# Patient Record
Sex: FEMALE | Race: OTHER | NOT HISPANIC OR LATINO | URBAN - METROPOLITAN AREA
[De-identification: names, ages, dates, MRNs, and addresses within clinical notes are randomized per-mention and may not be internally consistent; named-entity substitution may affect disease eponyms.]

---

## 2024-03-07 ENCOUNTER — EMERGENCY (EMERGENCY)
Facility: HOSPITAL | Age: 84
LOS: 1 days | Discharge: HOME | End: 2024-03-07
Attending: EMERGENCY MEDICINE
Payer: MEDICARE

## 2024-03-07 VITALS
SYSTOLIC BLOOD PRESSURE: 114 MMHG | RESPIRATION RATE: 18 BRPM | WEIGHT: 100.09 LBS | DIASTOLIC BLOOD PRESSURE: 65 MMHG | HEART RATE: 73 BPM | OXYGEN SATURATION: 98 % | TEMPERATURE: 98 F

## 2024-03-07 VITALS
SYSTOLIC BLOOD PRESSURE: 107 MMHG | RESPIRATION RATE: 18 BRPM | OXYGEN SATURATION: 97 % | DIASTOLIC BLOOD PRESSURE: 63 MMHG | HEART RATE: 81 BPM

## 2024-03-07 LAB
ALBUMIN SERPL ELPH-MCNC: 2.6 G/DL — LOW (ref 3.5–5)
ALP SERPL-CCNC: 130 U/L — HIGH (ref 40–120)
ALT FLD-CCNC: 31 U/L DA — SIGNIFICANT CHANGE UP (ref 10–60)
ANION GAP SERPL CALC-SCNC: 4 MMOL/L — LOW (ref 5–17)
AST SERPL-CCNC: 37 U/L — SIGNIFICANT CHANGE UP (ref 10–40)
BASOPHILS # BLD AUTO: 0.03 K/UL — SIGNIFICANT CHANGE UP (ref 0–0.2)
BASOPHILS NFR BLD AUTO: 0.4 % — SIGNIFICANT CHANGE UP (ref 0–2)
BILIRUB SERPL-MCNC: 0.2 MG/DL — SIGNIFICANT CHANGE UP (ref 0.2–1.2)
BUN SERPL-MCNC: 36 MG/DL — HIGH (ref 7–18)
CALCIUM SERPL-MCNC: 8.9 MG/DL — SIGNIFICANT CHANGE UP (ref 8.4–10.5)
CHLORIDE SERPL-SCNC: 102 MMOL/L — SIGNIFICANT CHANGE UP (ref 96–108)
CK SERPL-CCNC: 100 U/L — SIGNIFICANT CHANGE UP (ref 21–215)
CO2 SERPL-SCNC: 26 MMOL/L — SIGNIFICANT CHANGE UP (ref 22–31)
CREAT SERPL-MCNC: 1.15 MG/DL — SIGNIFICANT CHANGE UP (ref 0.5–1.3)
EGFR: 47 ML/MIN/1.73M2 — LOW
EOSINOPHIL # BLD AUTO: 0.13 K/UL — SIGNIFICANT CHANGE UP (ref 0–0.5)
EOSINOPHIL NFR BLD AUTO: 1.7 % — SIGNIFICANT CHANGE UP (ref 0–6)
GLUCOSE SERPL-MCNC: 121 MG/DL — HIGH (ref 70–99)
HCT VFR BLD CALC: 25.3 % — LOW (ref 34.5–45)
HGB BLD-MCNC: 8.3 G/DL — LOW (ref 11.5–15.5)
IMM GRANULOCYTES NFR BLD AUTO: 0.5 % — SIGNIFICANT CHANGE UP (ref 0–0.9)
LYMPHOCYTES # BLD AUTO: 0.93 K/UL — LOW (ref 1–3.3)
LYMPHOCYTES # BLD AUTO: 12.3 % — LOW (ref 13–44)
MAGNESIUM SERPL-MCNC: 2.2 MG/DL — SIGNIFICANT CHANGE UP (ref 1.6–2.6)
MCHC RBC-ENTMCNC: 32.7 PG — SIGNIFICANT CHANGE UP (ref 27–34)
MCHC RBC-ENTMCNC: 32.8 GM/DL — SIGNIFICANT CHANGE UP (ref 32–36)
MCV RBC AUTO: 99.6 FL — SIGNIFICANT CHANGE UP (ref 80–100)
MONOCYTES # BLD AUTO: 0.62 K/UL — SIGNIFICANT CHANGE UP (ref 0–0.9)
MONOCYTES NFR BLD AUTO: 8.2 % — SIGNIFICANT CHANGE UP (ref 2–14)
NEUTROPHILS # BLD AUTO: 5.84 K/UL — SIGNIFICANT CHANGE UP (ref 1.8–7.4)
NEUTROPHILS NFR BLD AUTO: 76.9 % — SIGNIFICANT CHANGE UP (ref 43–77)
NRBC # BLD: 0 /100 WBCS — SIGNIFICANT CHANGE UP (ref 0–0)
NT-PROBNP SERPL-SCNC: 486 PG/ML — HIGH (ref 0–450)
PLATELET # BLD AUTO: 355 K/UL — SIGNIFICANT CHANGE UP (ref 150–400)
POTASSIUM SERPL-MCNC: 5 MMOL/L — SIGNIFICANT CHANGE UP (ref 3.5–5.3)
POTASSIUM SERPL-SCNC: 5 MMOL/L — SIGNIFICANT CHANGE UP (ref 3.5–5.3)
PROT SERPL-MCNC: 6.6 G/DL — SIGNIFICANT CHANGE UP (ref 6–8.3)
RBC # BLD: 2.54 M/UL — LOW (ref 3.8–5.2)
RBC # FLD: 15.6 % — HIGH (ref 10.3–14.5)
SODIUM SERPL-SCNC: 132 MMOL/L — LOW (ref 135–145)
TROPONIN I, HIGH SENSITIVITY RESULT: 52.9 NG/L — SIGNIFICANT CHANGE UP
WBC # BLD: 7.59 K/UL — SIGNIFICANT CHANGE UP (ref 3.8–10.5)
WBC # FLD AUTO: 7.59 K/UL — SIGNIFICANT CHANGE UP (ref 3.8–10.5)

## 2024-03-07 PROCEDURE — 99285 EMERGENCY DEPT VISIT HI MDM: CPT | Mod: 25

## 2024-03-07 PROCEDURE — 71045 X-RAY EXAM CHEST 1 VIEW: CPT

## 2024-03-07 PROCEDURE — 72125 CT NECK SPINE W/O DYE: CPT | Mod: 26,MC

## 2024-03-07 PROCEDURE — 82550 ASSAY OF CK (CPK): CPT

## 2024-03-07 PROCEDURE — 36415 COLL VENOUS BLD VENIPUNCTURE: CPT

## 2024-03-07 PROCEDURE — 99285 EMERGENCY DEPT VISIT HI MDM: CPT

## 2024-03-07 PROCEDURE — 85025 COMPLETE CBC W/AUTO DIFF WBC: CPT

## 2024-03-07 PROCEDURE — 90715 TDAP VACCINE 7 YRS/> IM: CPT

## 2024-03-07 PROCEDURE — 83735 ASSAY OF MAGNESIUM: CPT

## 2024-03-07 PROCEDURE — 93010 ELECTROCARDIOGRAM REPORT: CPT

## 2024-03-07 PROCEDURE — 84484 ASSAY OF TROPONIN QUANT: CPT

## 2024-03-07 PROCEDURE — 72170 X-RAY EXAM OF PELVIS: CPT | Mod: 26

## 2024-03-07 PROCEDURE — 83880 ASSAY OF NATRIURETIC PEPTIDE: CPT

## 2024-03-07 PROCEDURE — 71045 X-RAY EXAM CHEST 1 VIEW: CPT | Mod: 26

## 2024-03-07 PROCEDURE — 90471 IMMUNIZATION ADMIN: CPT

## 2024-03-07 PROCEDURE — 70450 CT HEAD/BRAIN W/O DYE: CPT | Mod: MC

## 2024-03-07 PROCEDURE — 70486 CT MAXILLOFACIAL W/O DYE: CPT | Mod: 26,MC

## 2024-03-07 PROCEDURE — 80053 COMPREHEN METABOLIC PANEL: CPT

## 2024-03-07 PROCEDURE — 70486 CT MAXILLOFACIAL W/O DYE: CPT | Mod: MC

## 2024-03-07 PROCEDURE — 72170 X-RAY EXAM OF PELVIS: CPT

## 2024-03-07 PROCEDURE — 72125 CT NECK SPINE W/O DYE: CPT | Mod: MC

## 2024-03-07 PROCEDURE — 93005 ELECTROCARDIOGRAM TRACING: CPT

## 2024-03-07 PROCEDURE — 70450 CT HEAD/BRAIN W/O DYE: CPT | Mod: 26,MC

## 2024-03-07 RX ORDER — TETANUS TOXOID, REDUCED DIPHTHERIA TOXOID AND ACELLULAR PERTUSSIS VACCINE, ADSORBED 5; 2.5; 8; 8; 2.5 [IU]/.5ML; [IU]/.5ML; UG/.5ML; UG/.5ML; UG/.5ML
0.5 SUSPENSION INTRAMUSCULAR ONCE
Refills: 0 | Status: COMPLETED | OUTPATIENT
Start: 2024-03-07 | End: 2024-03-07

## 2024-03-07 RX ADMIN — TETANUS TOXOID, REDUCED DIPHTHERIA TOXOID AND ACELLULAR PERTUSSIS VACCINE, ADSORBED 0.5 MILLILITER(S): 5; 2.5; 8; 8; 2.5 SUSPENSION INTRAMUSCULAR at 18:36

## 2024-03-07 NOTE — ED ADULT TRIAGE NOTE - CHIEF COMPLAINT QUOTE
found in shower floor, unknown LOC, + head strike with abrasion in the top of the head, no blood thinners

## 2024-03-07 NOTE — ED PROVIDER NOTE - PHYSICAL EXAMINATION
Gen:  Awake, alert, NAD, elderly/frail, non-toxic appearing. No skull/facial tender, no step-offs, no raccoon/lee.  Eyes:  PERRL, EOMI, no icterus, normal lids/lashes, normal conjunctivae.  ENT:  External inspection normal, pink/moist membranes. Pharynx normal, no pharyngeal erythema/exudate, no drooling, no lip/tongue/palate/posterior pharynx edema, midline uvula, no oropharyngeal ulcerations/lesions. No septal hematoma, no sinus/tmj/dental/temporal/mastoid tender.]  CV:  S1S2, regular rate and rhythm, no murmur/gallops/rubs, no JVD, 2+ pulses b/l, no edema/cords/homans, warm/well-perfused.  Resp:  Normal respiratory rate/effort, no respiratory distress, normal voice, speaking full sentences, lungs clear to auscultation b/l, no wheezing/rales/rhonchi, no retractions, no stridor.  Abd:  Soft abdomen, no tender/distended/guarding/rebound, no pulsatile mass, no CVA tender.   Musculoskeletal:  N/V intact, FROM all 4 extremities, normal motor tone. Pelvis stable, no TLS spinal tender/deformity/step-offs, no perri tender/deformity.  Neck:  FROM neck, supple, trachea midline, no meningismus. No C-spine tender/deformity/step-offs.   Skin:  Color normal for race, warm and dry, no rash. R occiput hematoma w superficial abrasion; no bleeding or evidence of infection. R lateral orbit faint ecchymosis  Neuro:  Oriented to person, CN 2-12 intact (grossly), normal motor (grossly), normal sensory (grossly)  Psych:  Limited due to dementia

## 2024-03-07 NOTE — ED ADULT NURSE NOTE - NSFALLHARMRISKINTERV_ED_ALL_ED
Assistance OOB with selected safe patient handling equipment if applicable/Assistance with ambulation/Communicate risk of Fall with Harm to all staff, patient, and family/Monitor gait and stability/Provide visual cue: red socks, yellow wristband, yellow gown, etc/Reinforce activity limits and safety measures with patient and family/Bed in lowest position, wheels locked, appropriate side rails in place/Call bell, personal items and telephone in reach/Instruct patient to call for assistance before getting out of bed/chair/stretcher/Non-slip footwear applied when patient is off stretcher/[place] to call system/Physically safe environment - no spills, clutter or unnecessary equipment/Purposeful Proactive Rounding/Room/bathroom lighting operational, light cord in reach

## 2024-03-07 NOTE — ED PROVIDER NOTE - PATIENT PORTAL LINK FT
You can access the FollowMyHealth Patient Portal offered by [facility] by registering at the following website: [email or web address]. By joining [facility]’s FollowMyHealth portal, you will also be able to view your health information using other applications (apps) compatible with our system.

## 2024-03-07 NOTE — ED PROVIDER NOTE - OBJECTIVE STATEMENT
83 female with hx of GERD, anxiety, & Alzheimer's dementia.   Pt presenting to the ED with EMS reporting unwitnessed fall in shower.  No blood thinner use.  Patient reporting no complaints at this time.

## 2024-03-07 NOTE — ED PROVIDER NOTE - NSFOLLOWUPINSTRUCTIONS_ED_ALL_ED_FT
Please follow up with Dr Guzman in 2-3 days.  Return to the ER for worsening or concerning symptoms.  Apply ice to swollen areas  Keep wound clean and dry.  Apply antibiotic ointment to affected area twice a day.    - - - - - - - - - - - - -  Head Injury, Adult  Three rear views of the head showing how quick, sudden head movements injure the brain.  There are many types of head injuries. They can be as minor as a small bump. Some head injuries can be worse. Worse injuries include:  A strong hit to the head that shakes the brain back and forth, causing damage (concussion).  A bruise (contusion) of the brain. This means there is bleeding in the brain that can cause swelling.  A cracked skull (skull fracture).  Bleeding in the brain that gathers, gets thick (makes a clot), and forms a bump (hematoma).  Most problems from a head injury come in the first 24 hours. However, you may still have side effects up to 7–10 days after your injury. It is important to watch your condition for any changes. You may need to be watched in the emergency department or urgent care, or you may need to stay in the hospital.    What are the causes?  There are many possible causes of a head injury. A serious head injury may be caused by:  A car accident.  Bicycle or motorcycle accidents.  Sports injuries.  Falls.  Being hit by an object.  What are the signs or symptoms?  Symptoms of a head injury include a bruise, bump, or bleeding where the injury happened. Other physical symptoms may include:  Headache.  Feeling like you may vomit (nauseous) or vomiting.  Dizziness.  Blurred or double vision.  Being uncomfortable around bright lights or loud noises.  Feeling tired.  Trouble waking up.  Severe symptoms such as:  Feeling weak or numb on one side of the body.  Slurred speech.  Swallowing problems.  Fainting.  Shaking movements that you cannot control (seizures).  Mental or emotional symptoms may include:  Feeling grumpy or cranky.  Confusion and memory problems.  Having trouble paying attention or concentrating.  Changes in eating or sleeping habits.  Feeling worried or nervous (anxious).  Feeling sad (depressed).  How is this treated?  Treatment for this condition depends on how bad the injury is and the type of injury you have. The main goal is to prevent problems and to allow the brain time to heal.    Mild head injury    If you have a mild head injury, you may be sent home, and treatment may include:  Being watched. A responsible adult should stay with you for 24 hours after your injury and check on you often.  Physical rest.  Brain rest.  Pain medicines.  Very bad head injury    If you have a very bad head injury, treatment may include:  Being watched closely. This includes staying in the hospital.  Medicines to:  Help with pain.  Prevent seizures.  Help with brain swelling.  Protecting your airway and using a machine that helps you breathe (ventilator).  Watching for and manage swelling inside the brain.  Brain surgery. This may be needed to:  Remove a collection of blood or blood clots.  Stop the bleeding.  Remove a part of the skull. This allows room for the brain to swell.  Follow these instructions at home:  Activity    Rest.  Avoid activities that are hard or tiring.  Make sure you get enough sleep.  Let your brain rest. Do fewer activities that need a lot of thought or attention, such as:  Watching TV.  Playing memory games and doing puzzles.  Job-related work or homework.  Working on the computer, social media, and texting.  Avoid activities that could cause another head injury until your doctor says it is okay. This includes playing sports.  Ask your doctor when it is safe for you to go back to your normal activities, such as work or school.  Ask your doctor when you can drive, ride a bicycle, or use machines. Do not do these activities if you are dizzy.  Lifestyle    A sign telling the reader not to drink beer, wine, or hard liquor.  Do not drink alcohol until your doctor says it is okay.  Do not use drugs.  If it is hard to remember things, write them down.  If you are easily distracted, try to do one thing at a time.  Talk with family members or close friends when making important decisions.  Tell your friends, family, a trusted co-worker, and  about your injury, symptoms, and limits (restrictions). Have them watch for any problems that are new or getting worse.  General instructions    Take over-the-counter and prescription medicines only as told by your doctor.  Have a responsible adult stay with you for 24 hours after your head injury. This person should watch you for any changes in your symptoms and be ready to get help.  Keep all follow-up visits to catch any new problems early.  How is this prevented?    Having another head injury can be dangerous. Another injury can lead to brain damage, brain swelling, or death. You can avoid this by:  Working on your balance and strength. This can help you avoid falls.  Wearing a seat belt when you are in a moving vehicle.  Wearing a helmet when you:  Ride a bicycle.  Ski.  Do any other sport or activity that has a risk of injury.  Making your home safer by:  Getting rid of clutter from the floors and stairs. This includes things that can make you trip.  Using grab bars in bathrooms and handrails by stairs.  Placing non-slip mats on floors and in bathtubs.  Putting more light in dim areas.  Where to find more information  Brain Injury Association: biausa.org  Contact a doctor if:  These symptoms do not go away:  Headaches.  Dizziness.  Double vision or vision changes.  Trouble sleeping.  Changes in mood.  You have new symptoms.  Get help right away if:  You have sudden:  Headache that is very bad.  Vomiting that does not stop.  Changes in the size of one of your pupils. Pupils are the black centers of your eyes.  Changes in how you see (vision).  More confusion or more grumpy moods.  You have a seizure.  Your symptoms get worse.  You have a clear or bloody fluid coming from your nose or ears.  These symptoms may be an emergency. Get help right away. Call 911.  Do not wait to see if the symptoms will go away.  Do not drive yourself to the hospital.  This information is not intended to replace advice given to you by your health care provider. Make sure you discuss any questions you have with your health care provider.

## 2024-03-07 NOTE — ED PROVIDER NOTE - CLINICAL SUMMARY MEDICAL DECISION MAKING FREE TEXT BOX
83 female with hx of GERD, anxiety, & Alzheimer's dementia.   Pt presenting to the ED with EMS reporting unwitnessed fall in shower.  No blood thinner use.  Patient reporting no complaints at this time.    Vitals stable.  Nontoxic appearing, n/v intact.  Airway intact, no respiratory distress, no hypoxia.  No abdominal or CVA tenderness.  Non-focal neuro. + head trauma    Plan to obtain:    -Labs, EKG, XR's R/O fracture or PNTC, CT head/C-spine (r/o intracranial hemorrhage or mass r/o c-spine fx), analgesia/antiemetics as needed, observe/reassess    Lab values demonstrate no acute/emergent pathology.  My independent interpretation of the EKG: Sinus @ [], normal axis, normal intervals, normal ST/T  My independent interpretation of XR: [No consolidation/effusion/pntx]    [***]    [Patient advised regarding need for close outpatient follow up.  Patient stable for further care in outpatient setting. No indication for inpatient admission at this time. Patient advised regarding symptomatic & supportive care and symptoms to prompt ED return. Strict return precautions provided.]    [Patient requires inpatient admission for further care & stabilization. Care signed out to inpatient team.] 83 female with hx of GERD, anxiety, & Alzheimer's dementia.   Pt presenting to the ED with EMS reporting unwitnessed fall in shower.  No blood thinner use.  Patient reporting no complaints at this time.    Vitals stable.  Nontoxic appearing, n/v intact.  Airway intact, no respiratory distress, no hypoxia.  No abdominal or CVA tenderness.  Non-focal neuro. +Head trauma w superficial scalp abrasion; no bleeding or evidence of infection & no indication for laceration repair    Plan to obtain:    -Labs, EKG, XR's R/O fracture or PNTC, CT head/C-spine (r/o intracranial hemorrhage or mass r/o c-spine fx), analgesia/antiemetics as needed, observe/reassess    Lab values w low Hb. Trop neg & CK ok.  My independent interpretation of the EKG: Sinus @ 73, L axis, normal intervals, TWI V3-6 w no old for comparison  My independent interpretation of CXR: No consolidation/effusion/pntx  My independent interpretation of XR: No fracture/dislocation  Care d/w Dr Guzman who believes 8.5 is close to Pt's baseline Hb. No indication for transfusion at this time. Plan for discharge back to NH w close follow up w Dr Guzman  Patient stable for further care in outpatient setting. No indication for inpatient admission at this time.

## 2024-04-02 ENCOUNTER — EMERGENCY (EMERGENCY)
Facility: HOSPITAL | Age: 84
LOS: 1 days | Discharge: HOME | End: 2024-04-02
Attending: STUDENT IN AN ORGANIZED HEALTH CARE EDUCATION/TRAINING PROGRAM
Payer: MEDICARE

## 2024-04-02 VITALS
HEART RATE: 93 BPM | DIASTOLIC BLOOD PRESSURE: 74 MMHG | WEIGHT: 108.03 LBS | SYSTOLIC BLOOD PRESSURE: 137 MMHG | OXYGEN SATURATION: 96 % | RESPIRATION RATE: 16 BRPM | TEMPERATURE: 98 F

## 2024-04-02 PROCEDURE — 99284 EMERGENCY DEPT VISIT MOD MDM: CPT | Mod: 25

## 2024-04-02 PROCEDURE — 73560 X-RAY EXAM OF KNEE 1 OR 2: CPT | Mod: 26,50

## 2024-04-02 PROCEDURE — 70450 CT HEAD/BRAIN W/O DYE: CPT | Mod: 26,MC

## 2024-04-02 PROCEDURE — 70450 CT HEAD/BRAIN W/O DYE: CPT | Mod: MC

## 2024-04-02 PROCEDURE — 73560 X-RAY EXAM OF KNEE 1 OR 2: CPT

## 2024-04-02 PROCEDURE — 99285 EMERGENCY DEPT VISIT HI MDM: CPT

## 2024-04-02 NOTE — ED ADULT NURSE NOTE - NSFALLRISKINTERV_ED_ALL_ED

## 2024-04-02 NOTE — ED ADULT TRIAGE NOTE - CHIEF COMPLAINT QUOTE
Sent from Nursing Home for a fall with hematoma on right side of scalp requesting CT of head and Lumbar Area

## 2024-04-02 NOTE — ED PROVIDER NOTE - CLINICAL SUMMARY MEDICAL DECISION MAKING FREE TEXT BOX
84YO F wheelchair bound, dementia AOx2, p/w mechanical fall at 7:15pm with head trauma. vss, pe nml neuro exam. will r/o ICH w/ CT head, obtain xray b/l knee. given LLE weakness, do not suspect pelvic fracture given full ROM at hip, although will screen for knee injury w/ xrays given mild pain w/ movement. discussed case with pt's primary nurse at NH.

## 2024-04-02 NOTE — ED PROVIDER NOTE - PHYSICAL EXAMINATION
Gen: WDWN, NAD  HEENT: EOMI, no nasal discharge, mucous membranes moist, no scalp hematoma.   CV: 2+ radial pulses R  Resp:  no accessory muscle use, no increased work of breathing  GI: Abdomen soft non-distended, NTTP  MSK: No open wounds, no bruising, no LE edema, no midline spinal ttp, full ROM at b/l knees and hips, + mild bruising b/l knees.   Neuro: A&Ox2, following commands, moving all four extremities spontaneously. CN3-12 intact, EOMI. PERRLA, 5/5 strength in b/l, 5/5 R LE, 4/5 LLE.  Sensation intact bl in UE/LE.  Psych: appropriate mood

## 2024-04-02 NOTE — ED PROVIDER NOTE - OBJECTIVE STATEMENT
82YO F wheelchair bound, dementia AOx2, p/w mechanical fall at 7:15pm. witnessed by nurses, got up out of wheelchair and fell from standing height onto the ground with head trauma. no LOC prior to or after fall, pt at baseline MS after. also endorses b/l knee pain. pt not ambulatory at baseline. per nursing staff, pt without acute complaints, not on AC. pt denies vision changes, confusion, n/v, abd pain, cp, denies numbness/weakness/paresthesias to arms/legs.

## 2024-04-02 NOTE — ED PROVIDER NOTE - NSFOLLOWUPINSTRUCTIONS_ED_ALL_ED_FT
--take tylenol or motrin as needed for pain. Take tylenol 1000mg every 6 hours alternating with motrin 600 mg every 6 hours (take tylenol or motrin then three hours later take the other then three hours later take the first).  --given that you were in the ED today, we recommend a followup visit with your general doctor (primary care doctor) within 7 days.   --your diagnosis is: closed head injury  --return to the ED if vision changes, confusion, passing out.

## 2024-04-02 NOTE — ED ADULT NURSE NOTE - PRIMARY CARE PROVIDER
PRINCIPAL DISCHARGE DIAGNOSIS  Diagnosis: AML (acute myeloid leukemia)  Assessment and Plan of Treatment: Notify MD or report to ER for fever greater or equal to 100.4, persistent nausea, vomiting, diarrhea, bleeding.       Unknown

## 2024-04-02 NOTE — ED ADULT NURSE NOTE - OBJECTIVE STATEMENT
Pt brought in by EMS for Fall. Pt has a bruise on the left side of head. Pt is alert and oriented times X 2. Pt is able to verbalize needs but not able to comprehend conversation fully. Pt in bed comfortably. Pt does not endorse any pain at this time.

## 2024-04-03 VITALS
OXYGEN SATURATION: 99 % | DIASTOLIC BLOOD PRESSURE: 74 MMHG | HEART RATE: 85 BPM | SYSTOLIC BLOOD PRESSURE: 137 MMHG | TEMPERATURE: 99 F | RESPIRATION RATE: 15 BRPM

## 2024-04-03 NOTE — ED ADULT NURSE REASSESSMENT NOTE - NS ED NURSE REASSESS COMMENT FT1
Pt is beginning to get increasingly confused and attempting to get out of bed.   Pt placed on bed alarm. Pt is in the room right next to the nursing station.

## 2025-03-24 PROBLEM — Z00.00 ENCOUNTER FOR PREVENTIVE HEALTH EXAMINATION: Status: ACTIVE | Noted: 2025-03-24

## 2025-03-26 ENCOUNTER — APPOINTMENT (OUTPATIENT)
Dept: INTERNAL MEDICINE | Facility: CLINIC | Age: 85
End: 2025-03-26

## 2025-04-21 NOTE — ED ADULT NURSE NOTE - CAS TRG GEN SKIN CONDITION
Have You Had This Cosmetic Procedure Done Before?: has not had previous treatment
Additional History: [aware of pricing for up to 15 lesions]
Warm